# Patient Record
Sex: FEMALE | Race: WHITE | NOT HISPANIC OR LATINO | Employment: PART TIME | URBAN - METROPOLITAN AREA
[De-identification: names, ages, dates, MRNs, and addresses within clinical notes are randomized per-mention and may not be internally consistent; named-entity substitution may affect disease eponyms.]

---

## 2017-01-09 ENCOUNTER — OFFICE VISIT (OUTPATIENT)
Dept: FAMILY MEDICINE CLINIC | Facility: CLINIC | Age: 27
End: 2017-01-09

## 2017-01-09 VITALS
SYSTOLIC BLOOD PRESSURE: 110 MMHG | DIASTOLIC BLOOD PRESSURE: 70 MMHG | HEART RATE: 61 BPM | HEIGHT: 67 IN | TEMPERATURE: 98.8 F | WEIGHT: 166.4 LBS | OXYGEN SATURATION: 100 % | BODY MASS INDEX: 26.12 KG/M2

## 2017-01-09 DIAGNOSIS — R55 SYNCOPE, UNSPECIFIED SYNCOPE TYPE: Primary | ICD-10-CM

## 2017-01-09 DIAGNOSIS — R53.83 FATIGUE, UNSPECIFIED TYPE: ICD-10-CM

## 2017-01-09 LAB
BASOPHILS # BLD AUTO: 0.04 10*3/MM3 (ref 0–0.2)
BASOPHILS NFR BLD AUTO: 0.7 % (ref 0–1.5)
EOSINOPHIL # BLD AUTO: 0.14 10*3/MM3 (ref 0–0.7)
EOSINOPHIL NFR BLD AUTO: 2.6 % (ref 0.3–6.2)
ERYTHROCYTE [DISTWIDTH] IN BLOOD BY AUTOMATED COUNT: 13.7 % (ref 11.7–13)
FERRITIN SERPL-MCNC: 8.73 NG/ML (ref 13–150)
HCT VFR BLD AUTO: 39.4 % (ref 35.6–45.5)
HGB BLD-MCNC: 13 G/DL (ref 11.9–15.5)
IMM GRANULOCYTES # BLD: 0 10*3/MM3 (ref 0–0.03)
IMM GRANULOCYTES NFR BLD: 0 % (ref 0–0.5)
IRON SATN MFR SERPL: 16 % (ref 20–50)
IRON SERPL-MCNC: 79 MCG/DL (ref 37–145)
LYMPHOCYTES # BLD AUTO: 1.84 10*3/MM3 (ref 0.9–4.8)
LYMPHOCYTES NFR BLD AUTO: 33.8 % (ref 19.6–45.3)
MCH RBC QN AUTO: 30.5 PG (ref 26.9–32)
MCHC RBC AUTO-ENTMCNC: 33 G/DL (ref 32.4–36.3)
MCV RBC AUTO: 92.5 FL (ref 80.5–98.2)
MONOCYTES # BLD AUTO: 0.36 10*3/MM3 (ref 0.2–1.2)
MONOCYTES NFR BLD AUTO: 6.6 % (ref 5–12)
NEUTROPHILS # BLD AUTO: 3.07 10*3/MM3 (ref 1.9–8.1)
NEUTROPHILS NFR BLD AUTO: 56.3 % (ref 42.7–76)
PLATELET # BLD AUTO: 285 10*3/MM3 (ref 140–500)
RBC # BLD AUTO: 4.26 10*6/MM3 (ref 3.9–5.2)
TIBC SERPL-MCNC: 502 MCG/DL
UIBC SERPL-MCNC: 423 MCG/DL
WBC # BLD AUTO: 5.45 10*3/MM3 (ref 4.5–10.7)

## 2017-01-09 PROCEDURE — 99214 OFFICE O/P EST MOD 30 MIN: CPT | Performed by: NURSE PRACTITIONER

## 2017-01-09 RX ORDER — FLUTICASONE PROPIONATE 220 UG/1
AEROSOL, METERED RESPIRATORY (INHALATION)
Refills: 1 | COMMUNITY
Start: 2016-12-13 | End: 2019-09-26

## 2017-01-09 NOTE — MR AVS SNAPSHOT
Alejandra Bam   2017 10:40 AM   Office Visit    Dept Phone:  [phone]   Encounter #:  [id number]    Provider:  QUE Clayton   Department:  [facility]                Your Full Care Plan              Your Updated Medication List          This list is accurate as of: 17 10:56 AM.  Always use your most recent med list.                FLOVENT  MCG/ACT inhaler   Generic drug:  fluticasone       lansoprazole 30 MG capsule   Commonly known as:  PREVACID       LINZESS 290 MCG capsule capsule   Generic drug:  linaclotide       polyethylene glycol powder   Commonly known as:  MIRALAX               We Performed the Following     Ambulatory Referral to Cardiology     CBC & Differential     Iron Profile       You Were Diagnosed With        Codes Comments    Syncope, unspecified syncope type    -  Primary ICD-10-CM: R55  ICD-9-CM: 780.2     Fatigue, unspecified type     ICD-10-CM: R53.83  ICD-9-CM: 780.79       Instructions     None    Patient Instructions History      Upcoming Appointments     Visit Type Date Time Department    OFFICE VISIT 2017 10:40 AM ANDREY RIVERO      Raise Signup     [facility] Raise allows you to send messages to your doctor, view your test results, renew your prescriptions, schedule appointments, and more. To sign up, go to "[employer] and click on the Sign Up Now link in the New User? box. Enter your Raise Activation Code exactly as it appears below along with the last four digits of your Social Security Number and your Date of Birth () to complete the sign-up process. If you do not sign up before the expiration date, you must request a new code.    Raise Activation Code: [email or web address]  Expires: 2017 10:56 AM    If you have questions, you can email [email or web address] or call [phone] to talk to our Raise staff. Remember, Raise is NOT to be used for urgent  "needs. For medical emergencies, dial 911.               Other Info from Your Visit           Allergies     No Known Drug Allergy        Reason for Visit     Follow-up 2 week f/u on blacking out sitting b/p 110/80 p 73 laying b/p 108/76 p 79 standing b/p 112/80 p64      Vital Signs     Blood Pressure Pulse Temperature Height Weight Oxygen Saturation    110/70 (BP Location: Left arm, Patient Position: Sitting) 61 98.8 °F (37.1 °C) 67\" (170.2 cm) 166 lb 6.4 oz (75.5 kg) 100%    Body Mass Index Smoking Status                26.06 kg/m2 Current Every Day Smoker          Problems and Diagnoses Noted     Tiredness    Fainting        "

## 2017-01-09 NOTE — PROGRESS NOTES
"Subjective   Alejandra Kuhn is a 27 y.o. female.     History of Present Illness   Patient presenting to the office today to follow-up on passing out.  I will last saw HER-2 weeks ago she was advised to do position changes slowly and increase salt intake.  Patient states there has not been much improvement.  She has been doing her best to avoid allergy triggers because she has many but it happened again on 6 January she was at someone's home started to feel bad she always knows when it's coming and then a few minutes later she passed out.  She states at that time she did not eat anything that would have triggered an allergy.  Her blood pressure at the time when she passed out was 100/70.  She woke up easily to exactly what happened felt fine within a few minutes after passing out.  She did have an echo in 2014 which was normal.  The following portions of the patient's history were reviewed and updated as appropriate: allergies, current medications, past social history and problem list.    Review of Systems   All other systems reviewed and are negative.      Objective   Visit Vitals   • /70 (BP Location: Left arm, Patient Position: Sitting)   • Pulse 61   • Temp 98.8 °F (37.1 °C)   • Ht 67\" (170.2 cm)   • Wt 166 lb 6.4 oz (75.5 kg)   • SpO2 100%   • BMI 26.06 kg/m2     Physical Exam   Constitutional: She is oriented to person, place, and time. Vital signs are normal. She appears well-developed and well-nourished. No distress.   HENT:   Head: Normocephalic.   Cardiovascular: Normal rate, regular rhythm and normal heart sounds.    Pulmonary/Chest: Effort normal and breath sounds normal.   Neurological: She is alert and oriented to person, place, and time. Gait normal.   Psychiatric: She has a normal mood and affect. Her behavior is normal. Judgment and thought content normal.   Vitals reviewed.      Assessment/Plan   Problem List Items Addressed This Visit        Cardiovascular and Mediastinum    Syncope - Primary "    Relevant Orders    Adult Transthoracic Echo Complete    Ambulatory Referral to Cardiology    CBC & Differential    Iron Profile       Other    Fatigue    Relevant Orders    CBC & Differential    Iron Profile        Follow up after labs and testing     regarding a repeat the echo also refer to cardiology for workup.  She has an appointment with her GI and allergist coming up in January.

## 2017-01-17 ENCOUNTER — OFFICE VISIT (OUTPATIENT)
Dept: CARDIOLOGY | Facility: CLINIC | Age: 27
End: 2017-01-17

## 2017-01-17 VITALS
DIASTOLIC BLOOD PRESSURE: 78 MMHG | HEIGHT: 68 IN | BODY MASS INDEX: 25.01 KG/M2 | WEIGHT: 165 LBS | SYSTOLIC BLOOD PRESSURE: 134 MMHG

## 2017-01-17 DIAGNOSIS — R55 SYNCOPE, UNSPECIFIED SYNCOPE TYPE: Primary | ICD-10-CM

## 2017-01-17 DIAGNOSIS — I95.1 ORTHOSTATIC HYPOTENSION: ICD-10-CM

## 2017-01-17 PROCEDURE — 99203 OFFICE O/P NEW LOW 30 MIN: CPT | Performed by: PHYSICIAN ASSISTANT

## 2017-01-17 PROCEDURE — 93000 ELECTROCARDIOGRAM COMPLETE: CPT | Performed by: PHYSICIAN ASSISTANT

## 2017-01-17 NOTE — PROGRESS NOTES
"  Date of Office Visit: 2017  Encounter Provider: HERMINIA Newsome  Place of Service: [facility] CARDIOLOGY  Patient Name: Alejandra Kuhn  :1990    Chief Complaint   Patient presents with   • Loss of Consciousness   :     HPI: Alejandra Kuhn is a 27 y.o. female , new to me, who presents today for initial evaluation for syncope.  Old records a been obtained and reviewed by me.  She was referred to our practice by her primary care nurse practitioner.  The patient states that over the past 2 years she has been diagnosed with multiple food allergies.  This is an ongoing issue.  It seems like every couple of months she has a new food allergy.  When she eats something that she is allergic to, she will feel her throat swell up.  She feels like she is about to pass out, and can usually make it to a safe place.  When she does blackout, is for about 10-60 minutes.  Sometimes during the blackout.,  She is aware of conversations that are occurring around her, however she still is extremely weak and like she cannot lift her head up.  She had an episode last week when she ate a food that she did not know she was allergic to.  Her brother-in-law is a nurse practitioner and took her blood pressure right after she passed out.  It was 90/70.  She states normally her blood pressure runs 110/70.  He did check her heart rate at that time, but she does not know what it was.  She thinks he was not concerned about her heart rate.  She also complains of several episodes where, if she stands up too quickly or sits up too quickly from a laying position, she will \"blackout\".  She has been diagnosed with orthostatic hypotension by her primary care nurse practitioner.  These episodes are independent of the food allergy triggered syncopal episodes.  She works full time and works out at the gym 4 days a week.  When she goes to the gym, she does 45 minutes of intense cardiovascular exercise and about 30 " "minutes of weight lifting.  She does not have any issues with chest pain, shortness of breath, palpitations, dizziness, or syncope when she is exercising.  She states that sometimes when she is on the elliptical machine, her feet well \"go numb\", however she has her that this happens to a lot of people and she is not worried about it.She did have an echocardiogram on 9/11/2014 which revealed normal LV size, thickness, and function with an ejection fraction of 65%, normal RV size and function, and no valvular abnormalities.        Past Medical History   Diagnosis Date   • Allergic    • Colitis    • Confusion    • Constipation    • Diarrhea    • Dizziness    • Fatigue    • GERD (gastroesophageal reflux disease)    • Health care maintenance    • Sinusitis    • Speech abnormality    • Syncope        Past Surgical History   Procedure Laterality Date   • Breast augmentation         Social History     Social History   • Marital status:      Spouse name: N/A   • Number of children: N/A   • Years of education: N/A     Occupational History   • Not on file.     Social History Main Topics   • Smoking status: Current Every Day Smoker     Types: Cigarettes   • Smokeless tobacco: Never Used   • Alcohol use No   • Drug use: No   • Sexual activity: Yes     Partners: Male     Birth control/ protection: Condom     Other Topics Concern   • Not on file     Social History Narrative       Family History   Problem Relation Age of Onset   • Diabetes Father    • Diabetes Sister    • Diabetes Maternal Grandmother    • Diabetes Paternal Grandfather    • Colon cancer Paternal Grandfather        Review of Systems   Constitution: Negative for chills, fever, malaise/fatigue, weight gain and weight loss.   HENT: Negative for ear pain, headaches, hearing loss, nosebleeds and sore throat.    Eyes: Negative for double vision, pain and visual disturbance.   Cardiovascular: Positive for near-syncope and syncope. Negative for chest pain, dyspnea on " "exertion, irregular heartbeat, leg swelling, orthopnea, palpitations and paroxysmal nocturnal dyspnea.   Respiratory: Negative for cough, shortness of breath, sleep disturbances due to breathing, snoring and wheezing.    Endocrine: Negative for cold intolerance, heat intolerance and polyuria.   Skin: Negative for itching and rash.   Musculoskeletal: Negative for joint pain, joint swelling and myalgias.   Gastrointestinal: Negative for abdominal pain, diarrhea, melena, nausea and vomiting.   Genitourinary: Negative for frequency, hematuria and hesitancy.   Neurological: Negative for excessive daytime sleepiness, light-headedness, numbness, paresthesias and seizures.   Psychiatric/Behavioral: Negative for altered mental status and depression.   Allergic/Immunologic: Negative.    All other systems reviewed and are negative.      Allergies   Allergen Reactions   • No Known Drug Allergy          Current Outpatient Prescriptions:   •  FLOVENT  MCG/ACT inhaler, USE 2 PUFFS PO BID AND SWALLOW. DO NOT INHALE. DO NOT USE SPACER. DO NOT EAT OR DRINK FOR 30 MINUTES AFTER USING, Disp: , Rfl: 1  •  lansoprazole (PREVACID) 30 MG capsule, TK 1 C PO D, Disp: , Rfl: 3  •  LINZESS 290 MCG capsule capsule, TK 1 C PO D, Disp: , Rfl: 3  •  polyethylene glycol (MIRALAX) powder, , Disp: , Rfl: 2     Objective:     Vitals:    01/17/17 1050 01/17/17 1051 01/17/17 1052   BP: 134/78 134/78 134/78   BP Location: Right arm Right arm Right arm   Patient Position: Sitting Standing Lying   Weight:   165 lb (74.8 kg)   Height:   68\" (172.7 cm)     Body mass index is 25.09 kg/(m^2).    PHYSICAL EXAM:    Physical Exam   Constitutional: She is oriented to person, place, and time. She appears well-developed and well-nourished. No distress.   HENT:   Head: Normocephalic and atraumatic.   Eyes: Pupils are equal, round, and reactive to light.   Neck: No JVD present. No thyromegaly present.   Cardiovascular: Normal rate, regular rhythm, normal heart " sounds and intact distal pulses.    No murmur heard.  Pulmonary/Chest: Effort normal and breath sounds normal. No respiratory distress.   Abdominal: Soft. Bowel sounds are normal. She exhibits no distension. There is no splenomegaly or hepatomegaly. There is no tenderness.   Musculoskeletal: Normal range of motion. She exhibits no edema.   Neurological: She is alert and oriented to person, place, and time.   Skin: Skin is warm and dry. She is not diaphoretic. No erythema.   Psychiatric: She has a normal mood and affect. Her behavior is normal. Judgment normal.         ECG 12 Lead  Date/Time: 1/17/2017 10:33 AM  Performed by: JUSTIN NUÑEZ.  Authorized by: JUSTIN NUÑEZ   Comparison: compared with previous ECG from 12/12/2016  Similar to previous ECG  Rhythm: sinus rhythm  BPM: 75  Clinical impression: normal ECG  Comments: Indication: Syncope.              Assessment:       Diagnosis Plan   1. Syncope, unspecified syncope type  ECG 12 Lead    Cardiac Event Monitor   2. Orthostatic hypotension       Orders Placed This Encounter   Procedures   • Cardiac Event Monitor     Standing Status:   Future     Standing Expiration Date:   1/17/2018     Order Specific Question:   Reason for exam?     Answer:   Presyncope or Syncope   • ECG 12 Lead     This order was created via procedure documentation          Plan:       1.  Syncope.  It seems that her syncopal episodes are always associated with eating of food that she is allergic to.  I think that that happening is that she is having an anaphylactic reaction and her blood pressure is dropping accordingly.  I think this is causing her to have a syncopal episode.  However, I cannot rule out any kind of arrhythmia issue.  An echocardiogram has already been ordered by her primary care nurse practitioner but has not yet been performed.  I did discuss this with Dr. Yoo.  I'm going to order a 30 day event monitor.  I think that in 30 days we will most likely be able to capture  one of these syncopal episodes.    2.  Orthostatic hypotension.  She has been diagnosed by her primary care nurse practitioner with orthostatic hypotension, however today her blood pressure remained the same from laying to sitting to standing.  I did discuss with her that it was important to keep well-hydrated, increase her salt intake, and wear compression stockings to help with her orthostatic hypotension.  Also encouraged her to get up slowly when she was changing positions.  She indicated that she understood.    Further recommendations will be made pending the results of her echocardiogram and her event monitor.    As always, it has been a pleasure to participate in your patient's care.      Sincerely,         Viri Cruz PA-C

## 2017-01-17 NOTE — MR AVS SNAPSHOT
[facility] CARDIOLOGY  [phone]                    Alejandra Kuhn   2017 10:00 AM   Office Visit    Dept Phone:  [phone]   Encounter #:  [id number]    Provider:  HERMINIA Newsome   Department:  [facility] CARDIOLOGY                Your Full Care Plan              Your Updated Medication List          This list is accurate as of: 17 11:20 AM.  Always use your most recent med list.                FLOVENT  MCG/ACT inhaler   Generic drug:  fluticasone       lansoprazole 30 MG capsule   Commonly known as:  PREVACID       LINZESS 290 MCG capsule capsule   Generic drug:  linaclotide       polyethylene glycol powder   Commonly known as:  MIRALAX               We Performed the Following     ECG 12 Lead       You Were Diagnosed With        Codes Comments    Syncope, unspecified syncope type    -  Primary ICD-10-CM: R55  ICD-9-CM: 780.2     Orthostatic hypotension     ICD-10-CM: I95.1  ICD-9-CM: 458.0       Instructions     None    Patient Instructions History      Upcoming Appointments     Visit Type Date Time Department    NEW PATIENT 2017 10:00 AM [facility] LCG [facility] MAT CARDIAC EVENT MONITOR VT 2017 12:00 PM [facility] CARD EVENTS     MAT ECHO 2D COMPLETE VT 2017 10:45 AM  MAT LCG ECHO      FactualCopen Signup     [facility] WebinarHero allows you to send messages to your doctor, view your test results, renew your prescriptions, schedule appointments, and more. To sign up, go to [employer] and click on the Sign Up Now link in the New User? box. Enter your WebinarHero Activation Code exactly as it appears below along with the last four digits of your Social Security Number and your Date of Birth () to complete the sign-up process. If you do not sign up before the expiration date, you must request a new code.    WebinarHero Activation Code: [email or web address]  Expires: 2017 10:56 AM    If you have questions,  "you can email [email or web address] or call [phone] to talk to our MyChart staff. Remember, Picodeonhart is NOT to be used for urgent needs. For medical emergencies, dial 911.               Other Info from Your Visit           Your Appointments     Jan 17, 2017 12:00 PM EST   CARDIAC EVENT MONITOR VISIT with MAT FAJARDO EVENT   [facility] CARDIOLOGY ([facility])    [address] [place] [id number]               Jan 24, 2017 10:45 AM EST   ECHOCARDIOGRAM 2D COMPLETE VISIT with [facility] ECHO/VAS FRONT [facility] OUTPATIENT ECHOCARDIOGRAPHY ([place])    [address] [place] [address] [address]  [facility] [address] [phone]           Bring your insurance cards with you. Wear comfortable clothing and shoes.              Allergies     No Known Drug Allergy        Reason for Visit     Loss of Consciousness           Vital Signs     Blood Pressure Height Weight Body Mass Index Smoking Status       134/78 (BP Location: Right arm, Patient Position: Lying) 68\" (172.7 cm) 165 lb (74.8 kg) 25.09 kg/m2 Current Every Day Smoker       Problems and Diagnoses Noted     Blood pressure drop upon sitting or standing    Fainting      Results     ECG 12 Lead               "

## 2017-01-24 ENCOUNTER — HOSPITAL ENCOUNTER (OUTPATIENT)
Dept: CARDIOLOGY | Facility: HOSPITAL | Age: 27
Discharge: HOME | End: 2017-01-24
Admitting: NURSE PRACTITIONER

## 2017-01-24 VITALS
SYSTOLIC BLOOD PRESSURE: 114 MMHG | HEIGHT: 68 IN | BODY MASS INDEX: 25.01 KG/M2 | DIASTOLIC BLOOD PRESSURE: 68 MMHG | WEIGHT: 165 LBS | HEART RATE: 64 BPM

## 2017-01-24 DIAGNOSIS — R55 SYNCOPE, UNSPECIFIED SYNCOPE TYPE: ICD-10-CM

## 2017-01-24 LAB
BH CV ECHO MEAS - ACS: 2 CM
BH CV ECHO MEAS - AO MAX PG (FULL): 3.3 MMHG
BH CV ECHO MEAS - AO MAX PG: 6.2 MMHG
BH CV ECHO MEAS - AO MEAN PG (FULL): 1.2 MMHG
BH CV ECHO MEAS - AO MEAN PG: 2.4 MMHG
BH CV ECHO MEAS - AO ROOT AREA (BSA CORRECTED): 1.4
BH CV ECHO MEAS - AO ROOT AREA: 5.7 CM^2
BH CV ECHO MEAS - AO ROOT DIAM: 2.7 CM
BH CV ECHO MEAS - AO V2 MAX: 124.1 CM/SEC
BH CV ECHO MEAS - AO V2 MEAN: 66.2 CM/SEC
BH CV ECHO MEAS - AO V2 VTI: 26.6 CM
BH CV ECHO MEAS - AVA(I,A): 1.6 CM^2
BH CV ECHO MEAS - AVA(I,D): 1.6 CM^2
BH CV ECHO MEAS - AVA(V,A): 1.8 CM^2
BH CV ECHO MEAS - AVA(V,D): 1.8 CM^2
BH CV ECHO MEAS - BSA(HAYCOCK): 1.9 M^2
BH CV ECHO MEAS - BSA: 1.9 M^2
BH CV ECHO MEAS - BZI_BMI: 25.1 KILOGRAMS/M^2
BH CV ECHO MEAS - BZI_METRIC_HEIGHT: 172.7 CM
BH CV ECHO MEAS - BZI_METRIC_WEIGHT: 74.8 KG
BH CV ECHO MEAS - CONTRAST EF (2CH): 58.6 ML/M^2
BH CV ECHO MEAS - CONTRAST EF 4CH: 55.7 ML/M^2
BH CV ECHO MEAS - EDV(MOD-SP2): 70 ML
BH CV ECHO MEAS - EDV(MOD-SP4): 61 ML
BH CV ECHO MEAS - EDV(TEICH): 94 ML
BH CV ECHO MEAS - EF(CUBED): 67.7 %
BH CV ECHO MEAS - EF(MOD-SP2): 58.6 %
BH CV ECHO MEAS - EF(MOD-SP4): 55.7 %
BH CV ECHO MEAS - EF(TEICH): 59.3 %
BH CV ECHO MEAS - ESV(MOD-SP2): 29 ML
BH CV ECHO MEAS - ESV(MOD-SP4): 27 ML
BH CV ECHO MEAS - ESV(TEICH): 38.3 ML
BH CV ECHO MEAS - FS: 31.4 %
BH CV ECHO MEAS - IVS/LVPW: 1.1
BH CV ECHO MEAS - IVSD: 0.88 CM
BH CV ECHO MEAS - LAT PEAK E' VEL: 22 CM/SEC
BH CV ECHO MEAS - LV DIASTOLIC VOL/BSA (35-75): 32.4 ML/M^2
BH CV ECHO MEAS - LV MASS(C)D: 123.6 GRAMS
BH CV ECHO MEAS - LV MASS(C)DI: 65.6 GRAMS/M^2
BH CV ECHO MEAS - LV MAX PG: 2.8 MMHG
BH CV ECHO MEAS - LV MEAN PG: 1.2 MMHG
BH CV ECHO MEAS - LV SYSTOLIC VOL/BSA (12-30): 14.3 ML/M^2
BH CV ECHO MEAS - LV V1 MAX: 83.9 CM/SEC
BH CV ECHO MEAS - LV V1 MEAN: 49.5 CM/SEC
BH CV ECHO MEAS - LV V1 VTI: 16 CM
BH CV ECHO MEAS - LVIDD: 4.5 CM
BH CV ECHO MEAS - LVIDS: 3.1 CM
BH CV ECHO MEAS - LVLD AP2: 7.9 CM
BH CV ECHO MEAS - LVLD AP4: 7.5 CM
BH CV ECHO MEAS - LVLS AP2: 6.2 CM
BH CV ECHO MEAS - LVLS AP4: 6.7 CM
BH CV ECHO MEAS - LVOT AREA (M): 2.5 CM^2
BH CV ECHO MEAS - LVOT AREA: 2.6 CM^2
BH CV ECHO MEAS - LVOT DIAM: 1.8 CM
BH CV ECHO MEAS - LVPWD: 0.81 CM
BH CV ECHO MEAS - MED PEAK E' VEL: 15 CM/SEC
BH CV ECHO MEAS - MV A DUR: 0.09 SEC
BH CV ECHO MEAS - MV A MAX VEL: 46.1 CM/SEC
BH CV ECHO MEAS - MV DEC SLOPE: 367.4 CM/SEC^2
BH CV ECHO MEAS - MV DEC TIME: 0.18 SEC
BH CV ECHO MEAS - MV E MAX VEL: 76.6 CM/SEC
BH CV ECHO MEAS - MV E/A: 1.7
BH CV ECHO MEAS - MV MAX PG: 3.7 MMHG
BH CV ECHO MEAS - MV MEAN PG: 1.2 MMHG
BH CV ECHO MEAS - MV P1/2T MAX VEL: 78.1 CM/SEC
BH CV ECHO MEAS - MV P1/2T: 62.3 MSEC
BH CV ECHO MEAS - MV V2 MAX: 96.6 CM/SEC
BH CV ECHO MEAS - MV V2 MEAN: 48.1 CM/SEC
BH CV ECHO MEAS - MV V2 VTI: 27.1 CM
BH CV ECHO MEAS - MVA P1/2T LCG: 2.8 CM^2
BH CV ECHO MEAS - MVA(P1/2T): 3.5 CM^2
BH CV ECHO MEAS - MVA(VTI): 1.6 CM^2
BH CV ECHO MEAS - PA MAX PG (FULL): 0 MMHG
BH CV ECHO MEAS - PA MAX PG: 2.7 MMHG
BH CV ECHO MEAS - PA V2 MAX: 82.5 CM/SEC
BH CV ECHO MEAS - PULM A REVS DUR: 0.07 SEC
BH CV ECHO MEAS - PULM A REVS VEL: 20.9 CM/SEC
BH CV ECHO MEAS - PULM DIAS VEL: 50.9 CM/SEC
BH CV ECHO MEAS - PULM S/D: 0.77
BH CV ECHO MEAS - PULM SYS VEL: 39.3 CM/SEC
BH CV ECHO MEAS - PVA(V,A): 3.3 CM^2
BH CV ECHO MEAS - PVA(V,D): 3.3 CM^2
BH CV ECHO MEAS - QP/QS: 1.3
BH CV ECHO MEAS - RAP SYSTOLE: 3 MMHG
BH CV ECHO MEAS - RV MAX PG: 2.7 MMHG
BH CV ECHO MEAS - RV MEAN PG: 1.4 MMHG
BH CV ECHO MEAS - RV V1 MAX: 82.5 CM/SEC
BH CV ECHO MEAS - RV V1 MEAN: 53.2 CM/SEC
BH CV ECHO MEAS - RV V1 VTI: 17.2 CM
BH CV ECHO MEAS - RVOT AREA: 3.3 CM^2
BH CV ECHO MEAS - RVOT DIAM: 2.1 CM
BH CV ECHO MEAS - SI(AO): 81.1 ML/M^2
BH CV ECHO MEAS - SI(CUBED): 33.4 ML/M^2
BH CV ECHO MEAS - SI(LVOT): 22.5 ML/M^2
BH CV ECHO MEAS - SI(MOD-SP2): 21.8 ML/M^2
BH CV ECHO MEAS - SI(MOD-SP4): 18.1 ML/M^2
BH CV ECHO MEAS - SI(TEICH): 29.6 ML/M^2
BH CV ECHO MEAS - SUP REN AO DIAM: 1.6 CM
BH CV ECHO MEAS - SV(AO): 152.8 ML
BH CV ECHO MEAS - SV(CUBED): 63 ML
BH CV ECHO MEAS - SV(LVOT): 42.3 ML
BH CV ECHO MEAS - SV(MOD-SP2): 41 ML
BH CV ECHO MEAS - SV(MOD-SP4): 34 ML
BH CV ECHO MEAS - SV(RVOT): 57.1 ML
BH CV ECHO MEAS - SV(TEICH): 55.8 ML
BH CV ECHO MEAS - TAPSE (>1.6): 2.4 CM2
BH CV XLRA - RV BASE: 22.8 CM
BH CV XLRA - TDI S': 14 CM/SEC
E/E' RATIO: 4.5
LEFT ATRIUM VOLUME INDEX: 15 ML/M2
LV EF 2D ECHO EST: 56 %

## 2017-01-24 PROCEDURE — 93306 TTE W/DOPPLER COMPLETE: CPT

## 2017-01-24 PROCEDURE — 93306 TTE W/DOPPLER COMPLETE: CPT | Performed by: INTERNAL MEDICINE

## 2017-01-30 ENCOUNTER — OFFICE VISIT (OUTPATIENT)
Dept: OBSTETRICS AND GYNECOLOGY | Facility: CLINIC | Age: 27
End: 2017-01-30

## 2017-01-30 VITALS
BODY MASS INDEX: 24.71 KG/M2 | DIASTOLIC BLOOD PRESSURE: 68 MMHG | SYSTOLIC BLOOD PRESSURE: 123 MMHG | HEIGHT: 68 IN | WEIGHT: 163 LBS | HEART RATE: 71 BPM

## 2017-01-30 DIAGNOSIS — N92.6 LATE MENSES: Primary | ICD-10-CM

## 2017-01-30 DIAGNOSIS — N76.0 ACUTE VAGINITIS: ICD-10-CM

## 2017-01-30 LAB
B-HCG UR QL: NEGATIVE
INTERNAL NEGATIVE CONTROL: NEGATIVE
INTERNAL POSITIVE CONTROL: POSITIVE
Lab: NORMAL

## 2017-01-30 PROCEDURE — 81025 URINE PREGNANCY TEST: CPT | Performed by: OBSTETRICS & GYNECOLOGY

## 2017-01-30 PROCEDURE — 99213 OFFICE O/P EST LOW 20 MIN: CPT | Performed by: OBSTETRICS & GYNECOLOGY

## 2017-01-30 NOTE — MR AVS SNAPSHOT
Alejandra Kuhn   2017 12:30 PM   Office Visit    Dept Phone:  [phone]   Encounter #:  [id number]    Provider:  Leonor Crowe MD   Department:  [facility]                Your Full Care Plan              Your Updated Medication List          This list is accurate as of: 17  1:50 PM.  Always use your most recent med list.                FLOVENT  MCG/ACT inhaler   Generic drug:  fluticasone       lansoprazole 30 MG capsule   Commonly known as:  PREVACID       LINZESS 290 MCG capsule capsule   Generic drug:  linaclotide       polyethylene glycol powder   Commonly known as:  MIRALAX               We Performed the Following     NuSwab Vaginitis Plus     POC Pregnancy, Urine       You Were Diagnosed With        Codes Comments    Late menses    -  Primary ICD-10-CM: N91.0  ICD-9-CM: 626.8     Acute vaginitis     ICD-10-CM: N76.0  ICD-9-CM: 616.10       Instructions     None    Patient Instructions History      Upcoming Appointments     Visit Type Date Time Department    GYN FOLLOW UP 2017 12:30 PM ANDREY CODY PRES      GrupHediyehart Signup     [facility] Brainjuicer allows you to send messages to your doctor, view your test results, renew your prescriptions, schedule appointments, and more. To sign up, go to [employer] and click on the Sign Up Now link in the New User? box. Enter your Brainjuicer Activation Code exactly as it appears below along with the last four digits of your Social Security Number and your Date of Birth () to complete the sign-up process. If you do not sign up before the expiration date, you must request a new code.    Brainjuicer Activation Code: [email or web address]  Expires: 2017  1:50 PM    If you have questions, you can email [email or web address] or call [phone] to talk to our Brainjuicer staff. Remember, Brainjuicer is NOT to be used for urgent needs. For medical emergencies, dial 911.                "  Other Info from Your Visit           Allergies     No Known Drug Allergy        Reason for Visit     Vaginitis           Vital Signs     Blood Pressure Pulse Height Weight Body Mass Index Smoking Status    123/68 71 68\" (172.7 cm) 163 lb (73.9 kg) 24.78 kg/m2 Current Every Day Smoker      Problems and Diagnoses Noted     Inflammation of vagina    Late period    -  Primary      Results     POC Pregnancy, Urine      Component Value Standard Range & Units    HCG, Urine, QL Negative Negative    Lot Number [id number]     Internal Positive Control Positive     Internal Negative Control Negative                     "

## 2017-01-30 NOTE — PROGRESS NOTES
"Chief Complaint   Patient presents with   • Vaginitis       History of Present Illness    Patient is a 27 y.o. female complains of vaginal discharge with odor x 1 weeks with slight irritation the past few days. Pt also is not sure when last period was- may be late.    The following portions of the patient's history were reviewed and updated as appropriate: allergies, current medications, past family history, past medical history, past social history, past surgical history and problem list.    Review of Systems   Genitourinary: Positive for vaginal discharge.        See HPI   All other systems reviewed and are negative.      Vitals:    01/30/17 1302   BP: 123/68   Pulse: 71   Weight: 163 lb (73.9 kg)   Height: 68\" (172.7 cm)       Objective   Physical Exam   Constitutional: She appears well-developed and well-nourished.   HENT:   Head: Normocephalic and atraumatic.   Abdominal: Soft. She exhibits no distension and no mass. There is no tenderness. There is no rebound and no guarding. No hernia.   Genitourinary: Rectum normal, vagina normal and uterus normal. Rectal exam shows no external hemorrhoid. There is no lesion on the right labia. There is no lesion on the left labia. Uterus is not tender. Cervix exhibits no discharge. Right adnexum displays no mass and no tenderness. Left adnexum displays no mass and no tenderness. No vaginal discharge found.   Musculoskeletal: Normal range of motion.   Lymphadenopathy:        Right: No inguinal adenopathy present.        Left: No inguinal adenopathy present.   Neurological: She is alert.   Skin: Skin is warm.   Psychiatric: She has a normal mood and affect.         Assessment/Plan   Alejandra was seen today for vaginitis.    Diagnoses and all orders for this visit:    Late menses  -     POC Pregnancy, Urine    Acute vaginitis  -     NuSwab Vaginitis Plus    Differential diagnosis reviewed. Offered birth control - patient declines. Will contact patient with results.    (10/15 " minutes spent in face to face patient consultation)       Return if symptoms worsen or fail to improve.

## 2017-02-02 LAB
A VAGINAE DNA VAG QL NAA+PROBE: NORMAL SCORE
BVAB2 DNA VAG QL NAA+PROBE: NORMAL SCORE
C ALBICANS DNA VAG QL NAA+PROBE: NEGATIVE
C GLABRATA DNA VAG QL NAA+PROBE: NEGATIVE
C TRACH RRNA SPEC QL NAA+PROBE: NEGATIVE
MEGA1 DNA VAG QL NAA+PROBE: NORMAL SCORE
N GONORRHOEA RRNA SPEC QL NAA+PROBE: NEGATIVE
T VAGINALIS RRNA SPEC QL NAA+PROBE: NEGATIVE

## 2017-02-03 ENCOUNTER — TELEPHONE (OUTPATIENT)
Dept: OBSTETRICS AND GYNECOLOGY | Facility: CLINIC | Age: 27
End: 2017-02-03

## 2017-02-03 NOTE — TELEPHONE ENCOUNTER
----- Message from Leonor Crowe MD sent at 2/3/2017 10:54 AM EST -----  Please call patient about her normal results. Her cultures are normal.

## 2017-02-18 ENCOUNTER — OFFICE VISIT (OUTPATIENT)
Dept: RETAIL CLINIC | Facility: CLINIC | Age: 27
End: 2017-02-18

## 2017-02-18 VITALS
RESPIRATION RATE: 16 BRPM | SYSTOLIC BLOOD PRESSURE: 116 MMHG | HEART RATE: 101 BPM | TEMPERATURE: 98.5 F | DIASTOLIC BLOOD PRESSURE: 84 MMHG | OXYGEN SATURATION: 99 %

## 2017-02-18 DIAGNOSIS — J06.9 VIRAL UPPER RESPIRATORY TRACT INFECTION: Primary | ICD-10-CM

## 2017-02-18 LAB
EXPIRATION DATE: NORMAL
EXPIRATION DATE: NORMAL
FLUAV AG NPH QL: NORMAL
FLUBV AG NPH QL: NORMAL
INTERNAL CONTROL: NORMAL
INTERNAL CONTROL: NORMAL
Lab: NORMAL
Lab: NORMAL
S PYO AG THROAT QL: NEGATIVE

## 2017-02-18 PROCEDURE — 87880 STREP A ASSAY W/OPTIC: CPT | Performed by: NURSE PRACTITIONER

## 2017-02-18 PROCEDURE — 87804 INFLUENZA ASSAY W/OPTIC: CPT | Performed by: NURSE PRACTITIONER

## 2017-02-18 PROCEDURE — 99214 OFFICE O/P EST MOD 30 MIN: CPT | Performed by: NURSE PRACTITIONER

## 2017-02-18 RX ORDER — ALBUTEROL SULFATE 90 UG/1
2 AEROSOL, METERED RESPIRATORY (INHALATION) EVERY 4 HOURS PRN
Qty: 1 INHALER | Refills: 0 | Status: SHIPPED | OUTPATIENT
Start: 2017-02-18 | End: 2019-09-26

## 2017-02-18 RX ORDER — FLUTICASONE PROPIONATE 50 MCG
2 SPRAY, SUSPENSION (ML) NASAL DAILY
Qty: 1 EACH | Refills: 0 | Status: SHIPPED | OUTPATIENT
Start: 2017-02-18 | End: 2017-02-28

## 2017-02-18 RX ORDER — BROMPHENIRAMINE MALEATE, PSEUDOEPHEDRINE HYDROCHLORIDE, AND DEXTROMETHORPHAN HYDROBROMIDE 2; 30; 10 MG/5ML; MG/5ML; MG/5ML
5 SYRUP ORAL 4 TIMES DAILY PRN
Qty: 240 ML | Refills: 0 | Status: SHIPPED | OUTPATIENT
Start: 2017-02-18 | End: 2017-02-28

## 2017-02-18 NOTE — PROGRESS NOTES
[facility]    CC:   Chief Complaint   Patient presents with   • Sore Throat       SUBJECTIVE:  HPI  Patient is a 27 YOF who presents c/o over 7 days of sore throat with 2 days of fever to 101 associated with chest congestion/mild soa/nausea/heavy nasal mucus/bodyaches/fatigue/right ear pain. Reports she has multiple food allergies and initially thought symptoms were associated with this. Her cough is productive of thick mucus which is clear/white and nasal mucus is bright green then pales through day. She denies sinus pain/wheezing/headache. She has not taken anything otc for her symptoms. She reports expsure to flu and strep. She reports she has been too soa to smoke much recently. No change of medications or antibiotic use in past 1 year.    Review of Systems - Negative except   ENT ROS: positive for - sore throat  Allergy and Immunology ROS: positive for - nasal congestion, postnasal drip and seasonal allergies  Respiratory ROS: positive for - cough and shortness of breath      Past Medical History   Diagnosis Date   • Acid reflux    • Allergic    • Colitis    • Confusion    • Constipation    • Diarrhea    • Dizziness    • Fatigue    • GERD (gastroesophageal reflux disease)    • GERD (gastroesophageal reflux disease)    • Health care maintenance    • IBS (irritable bowel syndrome)    • Sinusitis    • Speech abnormality    • Syncope        Past Surgical History   Procedure Laterality Date   • Breast augmentation         Current Outpatient Prescriptions:   •  albuterol (PROVENTIL HFA;VENTOLIN HFA) 108 (90 BASE) MCG/ACT inhaler, Inhale 2 puffs Every 4 (Four) Hours As Needed for wheezing., Disp: 1 inhaler, Rfl: 0  •  brompheniramine-pseudoephedrine-DM 30-2-10 MG/5ML syrup, Take 5 mL by mouth 4 (Four) Times a Day As Needed for allergies., Disp: 240 mL, Rfl: 0  •  FLOVENT  MCG/ACT inhaler, USE 2 PUFFS PO BID AND SWALLOW. DO NOT INHALE. DO NOT USE SPACER. DO NOT EAT OR DRINK FOR 30 MINUTES AFTER USING,  Disp: , Rfl: 1  •  fluticasone (FLONASE) 50 MCG/ACT nasal spray, 2 sprays into each nostril Daily for 30 days., Disp: 1 each, Rfl: 0  •  lansoprazole (PREVACID) 30 MG capsule, TK 1 C PO D, Disp: , Rfl: 3  •  LINZESS 290 MCG capsule capsule, TK 1 C PO D, Disp: , Rfl: 3  •  polyethylene glycol (MIRALAX) powder, , Disp: , Rfl: 2    Allergies   Allergen Reactions   • Food Color Red [Red Dye]        OBJECTIVE:    Visit Vitals   • /84   • Pulse 101   • Temp 98.5 °F (36.9 °C) (Oral)   • Resp 16   • LMP Comment: denies pregnancy   • SpO2 99%       Lab Results (last 24 hours)     Procedure Component Value Units Date/Time    POC Rapid Strep A [[id number]]  (Normal) Collected:  02/18/17 1046    Specimen:  Other Updated:  02/18/17 1047     Rapid Strep A Screen Negative      Internal Control Passed      Lot Number [id number]      Expiration Date 7/2018     POC Influenza A / B [[id number]]  (Normal) Collected:  02/18/17 1048    Specimen:  Swab Updated:  02/18/17 1048     Rapid Influenza A Ag neg      Rapid Influenza B Ag neg      Internal Control Passed      Lot Number [id number]      Expiration Date 8/2018       Physical Exam   Constitutional: She is well-developed, well-nourished, and in no distress.   HENT:   Head: Normocephalic and atraumatic.   Right Ear: External ear normal.   Left Ear: External ear normal.   Nose: Nose normal.   Mouth/Throat: Oropharynx is clear and moist. No oropharyngeal exudate.   Right TM with serous effusion, bilateral present light reflexes, no erythema or bulging   Cardiovascular: Regular rhythm and normal heart sounds.  Exam reveals no gallop and no friction rub.    No murmur heard.  tachycardic   Pulmonary/Chest: Effort normal and breath sounds normal. No respiratory distress. She has no wheezes. She has no rales. She exhibits no tenderness.   Scattered rhonchi   Lymphadenopathy:     She has no cervical adenopathy.   Skin: Skin is warm and dry. No erythema.   Psychiatric: Mood, memory, affect and  judgment normal.   Vitals reviewed.    ASSESSMENT/PLAN    1. Viral upper respiratory tract infection    - albuterol (PROVENTIL HFA;VENTOLIN HFA) 108 (90 BASE) MCG/ACT inhaler; Inhale 2 puffs Every 4 (Four) Hours As Needed for wheezing.  Dispense: 1 inhaler; Refill: 0  - brompheniramine-pseudoephedrine-DM 30-2-10 MG/5ML syrup; Take 5 mL by mouth 4 (Four) Times a Day As Needed for allergies.  Dispense: 240 mL; Refill: 0  - fluticasone (FLONASE) 50 MCG/ACT nasal spray; 2 sprays into each nostril Daily for 30 days.  Dispense: 1 each; Refill: 0    You have been diagnosed with an upper respiratory infection (URI) which is likely viral. Viruses are not killed by antibiotics and therefore an antibiotic is not prescribed for you today. A viral illness can last between 3 and 14 days, and symptoms may persist the entire course of illness. Symptomatic treatment is best. You may take Mucinex D for relieving congestion and cough. If you have high blood pressure, do not take Mucinex D, instead opting for plain Mucinex and Corcidine HBP.  Take Ibuprofen or Tylenol as needed for pain or fever. An oral antihistamine such as Zyrtec or Claritin may help reduce ear pressure and relieve some nasal symptoms. A saline nasal spray may be used to help keep nares clear from discharge. Be sure that you are increasing your intake of clear, decaffinated fluids and get plenty of rest. If your symptoms worsen or persist, follow up with your primary care provider. Pt verbalized understanding of plan, visit summary given.

## 2017-02-21 ENCOUNTER — TELEPHONE (OUTPATIENT)
Dept: CARDIOLOGY | Facility: CLINIC | Age: 27
End: 2017-02-21

## 2017-02-21 NOTE — TELEPHONE ENCOUNTER
I called the patient and discussed with her the results of her echocardiogram and event monitor.  She did not have any syncopal episodes while she was wearing the monitor.  She believes that sometimes when she had sinus tach, she was either at the gym or sleep.  The times that she triggered the monitor, she felt a sharp pain in her chest.  She did have a syncopal episode 2 days ago, and she thinks that it was because she ate something to which she was allergic.  At this point in time, no further recommendations from a cardiology standpoint.  If she has any more problems, she can call our office and we will schedule her to see Dr. Yoo.  I did discuss this plan with Dr. Yoo as well, and she agreed.

## 2017-02-28 ENCOUNTER — OFFICE VISIT (OUTPATIENT)
Dept: FAMILY MEDICINE CLINIC | Facility: CLINIC | Age: 27
End: 2017-02-28

## 2017-02-28 VITALS
OXYGEN SATURATION: 99 % | DIASTOLIC BLOOD PRESSURE: 70 MMHG | SYSTOLIC BLOOD PRESSURE: 106 MMHG | WEIGHT: 163 LBS | TEMPERATURE: 98.4 F | HEART RATE: 72 BPM | HEIGHT: 68 IN | BODY MASS INDEX: 24.71 KG/M2

## 2017-02-28 DIAGNOSIS — Z91.018 MULTIPLE FOOD ALLERGIES: Primary | ICD-10-CM

## 2017-02-28 PROCEDURE — 99213 OFFICE O/P EST LOW 20 MIN: CPT | Performed by: NURSE PRACTITIONER

## 2017-02-28 NOTE — PROGRESS NOTES
"Subjective   Alejandra Kuhn is a 27 y.o. female.     History of Present Illness   Patient presenting to the office today to discuss her recent allergies.  Patient has several food allergies and she gets retested every year to see if anything is changed he was just retested and found to be allergic now to beef and chicken.  Chicken was basically the only thing that she was eating due to all of her other allergies.  So the only need that she is able to eat at this point as turkey.  She is concerned because most of all the things that she's been able to eat over the past year she's now allergic to.  She is wondering if there is an autoimmune disorder that can go along with this.  She would like to be tested for autoimmune disorders.  She did follow-up with a cardiologist and had a 30 day event recorder which was normal they were unable to explain the passing out.  Everyone seems to think it isn't involved and has to do with what she is allergic to.  It is felt that she eats something that she is allergic to her blood pressure drops and therefore makes her pass out.  She will follow-up with her GI doctor in 2 weeks and discuss the new allergy findings with her as well.  The following portions of the patient's history were reviewed and updated as appropriate: allergies, current medications, past social history and problem list.    Review of Systems   HENT: Positive for ear pain.    All other systems reviewed and are negative.      Objective   Visit Vitals   • /70 (BP Location: Right arm, Patient Position: Sitting)   • Pulse 72   • Temp 98.4 °F (36.9 °C)   • Ht 68\" (172.7 cm)   • Wt 163 lb (73.9 kg)   • SpO2 99%   • BMI 24.78 kg/m2     Physical Exam   Constitutional: She is oriented to person, place, and time. Vital signs are normal. She appears well-developed and well-nourished. No distress.   HENT:   Head: Normocephalic.   Cardiovascular: Normal rate, regular rhythm and normal heart sounds.    Pulmonary/Chest: " Effort normal and breath sounds normal.   Neurological: She is alert and oriented to person, place, and time. Gait normal.   Psychiatric: She has a normal mood and affect. Her behavior is normal. Judgment and thought content normal.   Vitals reviewed.      Assessment/Plan   Problem List Items Addressed This Visit        Other    Multiple food allergies - Primary    Relevant Orders    LUCINDA        Follow up after labs

## 2017-03-01 LAB — ANA SER QL: NEGATIVE

## 2019-09-26 ENCOUNTER — OFFICE VISIT (OUTPATIENT)
Dept: FAMILY MEDICINE CLINIC | Facility: CLINIC | Age: 29
End: 2019-09-26

## 2019-09-26 VITALS
TEMPERATURE: 97.8 F | OXYGEN SATURATION: 99 % | WEIGHT: 176 LBS | DIASTOLIC BLOOD PRESSURE: 84 MMHG | HEIGHT: 67 IN | HEART RATE: 92 BPM | SYSTOLIC BLOOD PRESSURE: 118 MMHG | BODY MASS INDEX: 27.62 KG/M2

## 2019-09-26 DIAGNOSIS — Z13.6 SCREENING FOR ISCHEMIC HEART DISEASE: ICD-10-CM

## 2019-09-26 DIAGNOSIS — Z13.1 SCREENING FOR DIABETES MELLITUS: ICD-10-CM

## 2019-09-26 DIAGNOSIS — Z00.00 ROUTINE GENERAL MEDICAL EXAMINATION AT A HEALTH CARE FACILITY: Primary | ICD-10-CM

## 2019-09-26 DIAGNOSIS — Z13.0 SCREENING FOR IRON DEFICIENCY ANEMIA: ICD-10-CM

## 2019-09-26 LAB
ALBUMIN SERPL-MCNC: 4.5 G/DL (ref 3.5–5.2)
ALBUMIN/GLOB SERPL: 2 G/DL
ALP SERPL-CCNC: 60 U/L (ref 39–117)
ALT SERPL-CCNC: 11 U/L (ref 1–33)
AST SERPL-CCNC: 12 U/L (ref 1–32)
BASOPHILS # BLD AUTO: 0.05 10*3/MM3 (ref 0–0.2)
BASOPHILS NFR BLD AUTO: 0.7 % (ref 0–1.5)
BILIRUB SERPL-MCNC: 0.7 MG/DL (ref 0.2–1.2)
BUN SERPL-MCNC: 10 MG/DL (ref 6–20)
BUN/CREAT SERPL: 13.9 (ref 7–25)
CALCIUM SERPL-MCNC: 9.3 MG/DL (ref 8.6–10.5)
CHLORIDE SERPL-SCNC: 102 MMOL/L (ref 98–107)
CHOLEST SERPL-MCNC: 151 MG/DL (ref 0–200)
CO2 SERPL-SCNC: 25.7 MMOL/L (ref 22–29)
CREAT SERPL-MCNC: 0.72 MG/DL (ref 0.57–1)
EOSINOPHIL # BLD AUTO: 0.31 10*3/MM3 (ref 0–0.4)
EOSINOPHIL NFR BLD AUTO: 4.5 % (ref 0.3–6.2)
ERYTHROCYTE [DISTWIDTH] IN BLOOD BY AUTOMATED COUNT: 14.3 % (ref 12.3–15.4)
GLOBULIN SER CALC-MCNC: 2.3 GM/DL
GLUCOSE SERPL-MCNC: 96 MG/DL (ref 65–99)
HCT VFR BLD AUTO: 39.3 % (ref 34–46.6)
HDLC SERPL-MCNC: 39 MG/DL (ref 40–60)
HGB BLD-MCNC: 12.8 G/DL (ref 12–15.9)
IMM GRANULOCYTES # BLD AUTO: 0.03 10*3/MM3 (ref 0–0.05)
IMM GRANULOCYTES NFR BLD AUTO: 0.4 % (ref 0–0.5)
LDLC SERPL CALC-MCNC: 87 MG/DL (ref 0–100)
LDLC/HDLC SERPL: 2.22 {RATIO}
LYMPHOCYTES # BLD AUTO: 1.91 10*3/MM3 (ref 0.7–3.1)
LYMPHOCYTES NFR BLD AUTO: 27.6 % (ref 19.6–45.3)
MCH RBC QN AUTO: 28.4 PG (ref 26.6–33)
MCHC RBC AUTO-ENTMCNC: 32.6 G/DL (ref 31.5–35.7)
MCV RBC AUTO: 87.1 FL (ref 79–97)
MONOCYTES # BLD AUTO: 0.44 10*3/MM3 (ref 0.1–0.9)
MONOCYTES NFR BLD AUTO: 6.4 % (ref 5–12)
NEUTROPHILS # BLD AUTO: 4.18 10*3/MM3 (ref 1.7–7)
NEUTROPHILS NFR BLD AUTO: 60.4 % (ref 42.7–76)
NRBC BLD AUTO-RTO: 0 /100 WBC (ref 0–0.2)
PLATELET # BLD AUTO: 325 10*3/MM3 (ref 140–450)
POTASSIUM SERPL-SCNC: 4 MMOL/L (ref 3.5–5.2)
PROT SERPL-MCNC: 6.8 G/DL (ref 6–8.5)
RBC # BLD AUTO: 4.51 10*6/MM3 (ref 3.77–5.28)
SODIUM SERPL-SCNC: 140 MMOL/L (ref 136–145)
TRIGL SERPL-MCNC: 127 MG/DL (ref 0–150)
VLDLC SERPL CALC-MCNC: 25.4 MG/DL
WBC # BLD AUTO: 6.92 10*3/MM3 (ref 3.4–10.8)

## 2019-09-26 PROCEDURE — 99395 PREV VISIT EST AGE 18-39: CPT | Performed by: NURSE PRACTITIONER

## 2019-09-26 NOTE — PROGRESS NOTES
"Subjective   Alejandra Kuhn is a 29 y.o. female.     History of Present Illness   Well Adult Physical: Patient here for a comprehensive physical exam.The patient reports no problems  Do you take any herbs or supplements that were not prescribed by a doctor? no Are you taking calcium supplements? no Are you taking aspirin daily? no      The following portions of the patient's history were reviewed and updated as appropriate: allergies, current medications, past social history and problem list.    Review of Systems   Constitutional: Negative for fever.   Respiratory: Negative for cough and shortness of breath.    Cardiovascular: Negative for chest pain.   Gastrointestinal: Negative for abdominal pain.   Neurological: Negative for dizziness.       Objective   /84 (BP Location: Left arm, Patient Position: Sitting)   Pulse 92   Temp 97.8 °F (36.6 °C)   Ht 170.2 cm (67\")   Wt 79.8 kg (176 lb)   SpO2 99%   BMI 27.57 kg/m²   Physical Exam   Constitutional: She is oriented to person, place, and time. She appears well-developed and well-nourished. No distress.   HENT:   Head: Normocephalic and atraumatic. Hair is normal.   Right Ear: Hearing, tympanic membrane, external ear and ear canal normal. No drainage. No decreased hearing is noted.   Left Ear: Hearing, tympanic membrane, external ear and ear canal normal. No decreased hearing is noted.   Nose: No nasal deformity.   Mouth/Throat: Oropharynx is clear and moist.   Eyes: Conjunctivae, EOM and lids are normal. Pupils are equal, round, and reactive to light. Right eye exhibits no discharge. Left eye exhibits no discharge.   Neck: Normal range of motion. Neck supple. No JVD present. No tracheal deviation present. No thyromegaly present.   Cardiovascular: Normal rate, regular rhythm, normal heart sounds, intact distal pulses and normal pulses. Exam reveals no gallop and no friction rub.   No murmur heard.  Pulmonary/Chest: Effort normal and breath sounds normal. No " respiratory distress. She has no wheezes. She has no rales. She exhibits no tenderness.   Abdominal: Soft. Bowel sounds are normal. She exhibits no distension and no mass. There is no tenderness. There is no rebound and no guarding. No hernia.   Musculoskeletal: Normal range of motion. She exhibits no edema, tenderness or deformity.   Lymphadenopathy:     She has no cervical adenopathy.   Neurological: She is alert and oriented to person, place, and time. She has normal reflexes. She displays normal reflexes. No cranial nerve deficit. She exhibits normal muscle tone. Coordination normal.   Skin: Skin is warm and dry. No rash noted. She is not diaphoretic. No erythema.   Psychiatric: She has a normal mood and affect. Her behavior is normal. Judgment and thought content normal.   Vitals reviewed.      Assessment/Plan      Diagnosis Plan   1. Routine general medical examination at a health care facility     2. Screening for iron deficiency anemia  CBC & Differential   3. Screening for diabetes mellitus  Comprehensive Metabolic Panel   4. Screening for ischemic heart disease  Lipid Panel With LDL / HDL Ratio     Discussed weight, diet and exercise  Follow up after labs      QUE Clayton  9/26/2019